# Patient Record
Sex: MALE | Race: WHITE | Employment: STUDENT | URBAN - NONMETROPOLITAN AREA
[De-identification: names, ages, dates, MRNs, and addresses within clinical notes are randomized per-mention and may not be internally consistent; named-entity substitution may affect disease eponyms.]

---

## 2023-11-02 ENCOUNTER — OFFICE VISIT (OUTPATIENT)
Age: 38
End: 2023-11-02

## 2023-11-02 VITALS
WEIGHT: 290 LBS | HEART RATE: 85 BPM | SYSTOLIC BLOOD PRESSURE: 115 MMHG | RESPIRATION RATE: 18 BRPM | OXYGEN SATURATION: 98 % | TEMPERATURE: 97.3 F | DIASTOLIC BLOOD PRESSURE: 76 MMHG

## 2023-11-02 DIAGNOSIS — J01.00 ACUTE NON-RECURRENT MAXILLARY SINUSITIS: Primary | ICD-10-CM

## 2023-11-02 PROCEDURE — 99999 PR OFFICE/OUTPT VISIT,PROCEDURE ONLY: CPT | Performed by: NURSE PRACTITIONER

## 2023-11-02 RX ORDER — ALBUTEROL SULFATE 90 UG/1
2 AEROSOL, METERED RESPIRATORY (INHALATION) EVERY 6 HOURS PRN
COMMUNITY

## 2023-11-02 RX ORDER — AMOXICILLIN AND CLAVULANATE POTASSIUM 875; 125 MG/1; MG/1
1 TABLET, FILM COATED ORAL 2 TIMES DAILY
Qty: 14 TABLET | Refills: 0 | Status: SHIPPED | OUTPATIENT
Start: 2023-11-02 | End: 2023-11-09

## 2023-11-02 ASSESSMENT — ENCOUNTER SYMPTOMS
EYES NEGATIVE: 1
SINUS PAIN: 1
COUGH: 1
GASTROINTESTINAL NEGATIVE: 1
SINUS PRESSURE: 1
SORE THROAT: 1

## 2023-11-02 NOTE — PROGRESS NOTES
[address] [place] [address]  Dept: [phone]  Dept Fax: [phone]    Eboni Silva is a 43 y.o. Kaveh Pennington presents to the [facility] today for c/o of URI      HPI:     26-year-old male presents to clinic today with complaints of 4 days head cold type symptoms, cough, sinus pressure and congestion, sinus pain above his teeth area. Verbalizes he did take COVID test at home yesterday which was negative. No past medical history on file. Current Outpatient Medications   Medication Sig Dispense Refill    methotrexate 1 g chemo injection Infuse 15 mg/m2 intravenously once a week      amoxicillin-clavulanate (AUGMENTIN) 875-125 MG per tablet Take 1 tablet by mouth 2 times daily for 7 days 14 tablet 0    albuterol sulfate HFA (VENTOLIN HFA) 108 (90 Base) MCG/ACT inhaler Inhale 2 puffs into the lungs every 6 hours as needed for Wheezing       No current facility-administered medications for this visit. No Known Allergies    :     Review of Systems   Constitutional: Negative. HENT:  Positive for congestion, postnasal drip, sinus pressure, sinus pain and sore throat. Negative for ear pain. Eyes: Negative. Respiratory:  Positive for cough. Cardiovascular: Negative. Gastrointestinal: Negative. Genitourinary: Negative. Musculoskeletal: Negative. Skin: Negative. Neurological:  Positive for headaches. Psychiatric/Behavioral: Negative.         :     Physical Exam  Vitals and nursing note reviewed. Constitutional:       General: He is not in acute distress. Appearance: He is well-developed. He is not ill-appearing or toxic-appearing. HENT:      Head: Normocephalic and atraumatic.       Right Ear: Tympanic membrane, ear canal and external ear normal.      Left Ear: Tympanic membrane, ear canal and external ear normal.      Nose: